# Patient Record
Sex: MALE | Race: WHITE
[De-identification: names, ages, dates, MRNs, and addresses within clinical notes are randomized per-mention and may not be internally consistent; named-entity substitution may affect disease eponyms.]

---

## 2022-01-24 ENCOUNTER — HOSPITAL ENCOUNTER (EMERGENCY)
Dept: HOSPITAL 26 - MED | Age: 27
Discharge: HOME | End: 2022-01-24
Payer: MEDICAID

## 2022-01-24 VITALS — WEIGHT: 157.25 LBS | BODY MASS INDEX: 24.68 KG/M2 | HEIGHT: 67 IN

## 2022-01-24 VITALS — DIASTOLIC BLOOD PRESSURE: 89 MMHG | SYSTOLIC BLOOD PRESSURE: 151 MMHG

## 2022-01-24 DIAGNOSIS — R51.9: ICD-10-CM

## 2022-01-24 DIAGNOSIS — Z88.0: ICD-10-CM

## 2022-01-24 DIAGNOSIS — M79.10: ICD-10-CM

## 2022-01-24 DIAGNOSIS — F17.210: Primary | ICD-10-CM

## 2022-01-24 DIAGNOSIS — R11.0: ICD-10-CM

## 2022-01-24 LAB
ANION GAP SERPL CALCULATED.3IONS-SCNC: 21.6 MMOL/L (ref 8–16)
BUN SERPL-MCNC: 17 MG/DL (ref 7–18)
CHLORIDE SERPL-SCNC: 101 MMOL/L (ref 98–107)
CO2 SERPL-SCNC: 22.8 MMOL/L (ref 21–32)
CREAT SERPL-MCNC: 1 MG/DL (ref 0.6–1.3)
GFR SERPL CREATININE-BSD FRML MDRD: 114 ML/MIN (ref 90–?)
GLUCOSE SERPL-MCNC: 79 MG/DL (ref 74–106)
MAGNESIUM SERPL-MCNC: 1.8 MG/DL (ref 1.8–2.4)
POTASSIUM SERPL-SCNC: 4.4 MMOL/L (ref 3.5–5.1)
SODIUM SERPL-SCNC: 141 MMOL/L (ref 136–145)

## 2022-01-24 PROCEDURE — 80048 BASIC METABOLIC PNL TOTAL CA: CPT

## 2022-01-24 PROCEDURE — 83735 ASSAY OF MAGNESIUM: CPT

## 2022-01-24 PROCEDURE — G0482 DRUG TEST DEF 15-21 CLASSES: HCPCS

## 2022-01-24 PROCEDURE — 36415 COLL VENOUS BLD VENIPUNCTURE: CPT

## 2022-01-24 PROCEDURE — 99283 EMERGENCY DEPT VISIT LOW MDM: CPT

## 2022-01-24 NOTE — NUR
25 Y/O MALE C/O FEELING UNWELL AFTER BEING OFF DEPAKOTE X1 WEEK. PT REPORTS HE 
HAS BEEN HAVING AN AURA SINCE THIS MORNING. C/O DIFFICULTY FOCUSING, SHARP PAIN 
IN BACK OF EYES, BACK PAIN, FEELS LIKE HE IS SHAKING AND "FEELS LIKE IM WALKING 
ON WATER". PT UNAWARE IF HE HAD SEIZURE THIS MORNING STATED THAT HE WOKE UP 
CONFUSED. PT ADMITS TO DRINKING LAST NIGHT WHICH HAS CAUSED SEIZURES IN THE 
PAST, "6 BEERS AND A FEW SHOTS". PT REPORTS THAT HE JUST HASNT PICKED UP HIS 
PRESCRIPTIONS SINCE HE HAS BEEN [place] OF T[place]. PT A/O X4 WITH EVEN AND UNLABORED 
RESPIRATIONS, NO SIGNS OF DISTRESS AT THIS TIME. 



PMH:SEIZURES, ALCOHOL AND STRESS INDUCED

ALLERGIES:PCN

## 2022-01-24 NOTE — NUR
Patient discharged with v/s stable. Written and verbal after care instructions 
ABOUT SEIZURE AND ALCOHOL USE DISORDER given and explained. 

Patient alert, oriented and verbalized understanding of instructions. 
Ambulatory with steady gait. All questions addressed prior to discharge. ID 
band removed. Patient advised to follow up with PMD. Rx of DEPAKOTE given. 
Patient educated on indication of medication including possible reaction and 
side effects. Opportunity to ask questions provided and answered.